# Patient Record
Sex: MALE | Race: WHITE
[De-identification: names, ages, dates, MRNs, and addresses within clinical notes are randomized per-mention and may not be internally consistent; named-entity substitution may affect disease eponyms.]

---

## 2017-03-12 NOTE — EDM.PDOC
ED HPI Trauma





- General


Chief Complaint: Upper Extremity Injury/Pain


Stated Complaint: elbow pain


Time Seen by Provider: 03/12/17 14:00


Source: Reports: Patient


History Limitations: Reports: No limitations





- History of Present Illness


INITIAL COMMENTS - FREE TEXT/NARRATIVE: 





History of present illness:


[52-year-old male presenting with acute pain to left shoulder and left elbow 

status post slip and fall on ice. Patient indicates this occurred at 2000 last 

night. Indicated he went with family in thinking it was just a contusion and 

hoping that he could sleep off the problem. Now he indicates that he is having 

increased pain and range of motion has become problematic secondary to level of 

pain with attempted movement. Patient has notable decrease strength to the left 

arm.]





Review of systems: 


As per history of present illness and below otherwise all systems reviewed and 

negative.





Past medical history: 


As per history of present illness and as reviewed below otherwise 

noncontributory.





Surgical history: 


As per history of present illness and as reviewed below otherwise 

noncontributory.





Social history: 


No reported history of drug or alcohol abuse.





Family history: 


As per history of present illness and as reviewed below otherwise 

noncontributory.





Physical exam:


HEENT: Atraumatic, normocephalic, pupils reactive, negative for conjunctival 

pallor or scleral icterus, mucous membranes moist, throat clear, neck supple, 

nontender, trachea midline.


Lungs: Clear to auscultation, breath sounds equal bilaterally, chest nontender.


Heart: S1S2, regular, negative for clicks, rubs, or JVD.


Abdomen: Soft, nondistended, nontender. Negative for masses or 

hepatosplenomegaly. Negative for costovertebral tenderness.


Pelvis: Stable nontender.


Genitourinary: Deferred.


Rectal: Deferred.


Extremities: Atraumatic, negative for cords or calf pain. Neurovascular 

unremarkable.


Neuro: Awake, alert, oriented. Cranial nerves II through XII unremarkable. 

Cerebellum unremarkable. Motor and sensory unremarkable throughout. Exam 

nonfocal.








X-ray negative for any acute bony injury cannot rule out soft tissue damage 

which is consistent with patient's pain we'll provide a sling patient indicates 

has sling at home we'll provide a run of pain medicine until he can followup 

with primary care provider and evaluate for physical therapy





Diagnostics:


[X-ray of left shoulder and left elbow]





Therapeutics:


[Toradol 60 mg IM]





Impression: 


[Soft tissue injury/left shoulder pain]





Plan:


[Followup with primary care provider]





Definitive disposition and diagnosis as appropriate pending reevaluation and 

review of above.








Allergies/ADRs: 


Allergies





No Known Allergies Allergy (Verified 03/12/17 14:36)


 








Home Medications: 


Ambulatory Orders





. [No Known Home Meds]  03/12/17 [Confirmed 03/12/17]











Review of Systems





- Review of Systems


Review Of Systems: See Below (See history of present illness)





Trauma Exam





- Physical Exam


Exam: See Below (See history of present illness)





Course





- Vital Signs


Last Recorded V/S: 


 Last Vital Signs











Temp  37.8 C   03/12/17 14:33


 


Pulse  99   03/12/17 14:33


 


Resp  18   03/12/17 14:33


 


BP  165/95 H  03/12/17 14:33


 


Pulse Ox  94 L  03/12/17 14:33














- Orders/Labs/Meds


Orders: 


 Active Orders 24 hr











 Category Date Time Status


 


 Elbow Min 3V Lt [CR] Stat Exams  03/12/17 14:28 Taken


 


 Shoulder Comp Lt [CR] Stat Exams  03/12/17 14:28 Taken











Meds: 


Medications














Discontinued Medications














Generic Name Dose Route Start Last Admin





  Trade Name Freq  PRN Reason Stop Dose Admin


 


Ketorolac Tromethamine  60 mg  03/12/17 14:28  03/12/17 14:56





  Toradol  IM  03/12/17 14:29  60 mg





  ONETIME ONE   Administration














Departure





- Departure


Time of Disposition: 15:43


Disposition: Home, Self-Care 01


Condition: good


Clinical Impression: 


Shoulder pain, acute


Qualifiers:


 Laterality: left Qualified Code(s): M25.512 - Pain in left shoulder





Instructions:  Shoulder Pain, Easy-to-Read, How to Use a Sling, Easy-to-Read


Forms:  ED Department Discharge


Additional Instructions: 


The following information is given to patients seen in the emergency department 

who are being discharged to home. This information is to outline your options 

for follow-up care. We provide all patients seen in our emergency department 

with a follow-up referral.





The need for follow-up, as well as the timing and circumstances, are variable 

depending upon the specifics of your emergency department visit.





If you don't have a primary care physician on staff, we will provide you with a 

referral. We always advise you to contact your personal physician following an 

emergency department visit to inform them of the circumstance of the visit and 

for follow-up with them and/or the need for any referrals to a consulting 

specialist.





The emergency department will also refer you to a specialist when appropriate. 

This referral assures that you have the opportunity for follow-up care with a 

specialist. All of these measure are taken in an effort to provide you with 

optimal care, which includes your follow-up.





Under all circumstances we always encourage you to contact your private 

physician who remains a resource for coordinating your care. When calling for 

follow-up care, please make the office aware that this follow-up is from your 

recent emergency room visit. If for any reason you are refused follow-up, 

please contact the [facility] Emergency 

Department at [phone] and asked to speak to the emergency department 

charge nurse.





Followup with primary care provider one to 2 days








Keep the arm iced and elevated and in sling as much as possible





You've been given some take home  pain medication











Return to ED as needed as discussed





- My Orders


Last 24 Hours: 


My Active Orders





03/12/17 14:28


Elbow Min 3V Lt [CR] Stat 


Shoulder Comp Lt [CR] Stat 














- Assessment/Plan


Last 24 Hours: 


My Active Orders





03/12/17 14:28


Elbow Min 3V Lt [CR] Stat 


Shoulder Comp Lt [CR] Stat

## 2022-01-21 ENCOUNTER — HOSPITAL ENCOUNTER (OUTPATIENT)
Dept: HOSPITAL 38 - CC.SDS | Age: 57
End: 2022-01-21
Attending: FAMILY MEDICINE
Payer: COMMERCIAL

## 2022-01-21 VITALS — HEART RATE: 81 BPM | DIASTOLIC BLOOD PRESSURE: 80 MMHG | SYSTOLIC BLOOD PRESSURE: 111 MMHG

## 2022-01-21 DIAGNOSIS — F17.210: ICD-10-CM

## 2022-01-21 DIAGNOSIS — Z79.84: ICD-10-CM

## 2022-01-21 DIAGNOSIS — K63.5: ICD-10-CM

## 2022-01-21 DIAGNOSIS — Z12.11: Primary | ICD-10-CM

## 2022-01-21 DIAGNOSIS — Z79.899: ICD-10-CM

## 2022-01-21 DIAGNOSIS — Z90.49: ICD-10-CM

## 2022-01-21 DIAGNOSIS — E11.9: ICD-10-CM

## 2022-01-21 DIAGNOSIS — Z98.890: ICD-10-CM

## 2022-01-21 DIAGNOSIS — Z88.8: ICD-10-CM

## 2022-01-21 DIAGNOSIS — E78.2: ICD-10-CM

## 2022-01-21 DIAGNOSIS — K57.30: ICD-10-CM

## 2025-01-24 ENCOUNTER — HOSPITAL ENCOUNTER (OUTPATIENT)
Dept: HOSPITAL 38 - CC.SDS | Age: 60
Discharge: HOME | End: 2025-01-24
Attending: FAMILY MEDICINE
Payer: COMMERCIAL

## 2025-01-24 VITALS — SYSTOLIC BLOOD PRESSURE: 131 MMHG | HEART RATE: 82 BPM | DIASTOLIC BLOOD PRESSURE: 71 MMHG

## 2025-01-24 DIAGNOSIS — E11.9: ICD-10-CM

## 2025-01-24 DIAGNOSIS — Z86.0100: ICD-10-CM

## 2025-01-24 DIAGNOSIS — F17.200: ICD-10-CM

## 2025-01-24 DIAGNOSIS — I10: ICD-10-CM

## 2025-01-24 DIAGNOSIS — K57.30: ICD-10-CM

## 2025-01-24 DIAGNOSIS — E78.2: ICD-10-CM

## 2025-01-24 DIAGNOSIS — Z79.899: ICD-10-CM

## 2025-01-24 DIAGNOSIS — K63.89: ICD-10-CM

## 2025-01-24 DIAGNOSIS — Z79.84: ICD-10-CM

## 2025-01-24 DIAGNOSIS — Z12.11: Primary | ICD-10-CM

## 2025-01-24 DIAGNOSIS — D12.5: ICD-10-CM
